# Patient Record
Sex: FEMALE
[De-identification: names, ages, dates, MRNs, and addresses within clinical notes are randomized per-mention and may not be internally consistent; named-entity substitution may affect disease eponyms.]

---

## 2023-02-06 PROBLEM — Z00.00 ENCOUNTER FOR PREVENTIVE HEALTH EXAMINATION: Status: ACTIVE | Noted: 2023-02-06

## 2023-03-20 ENCOUNTER — APPOINTMENT (OUTPATIENT)
Dept: ENDOCRINOLOGY | Facility: CLINIC | Age: 33
End: 2023-03-20
Payer: SELF-PAY

## 2023-03-20 VITALS
SYSTOLIC BLOOD PRESSURE: 108 MMHG | WEIGHT: 129 LBS | TEMPERATURE: 98.3 F | BODY MASS INDEX: 22.86 KG/M2 | HEIGHT: 63 IN | DIASTOLIC BLOOD PRESSURE: 62 MMHG | OXYGEN SATURATION: 99 % | HEART RATE: 48 BPM

## 2023-03-20 DIAGNOSIS — E04.2 NONTOXIC MULTINODULAR GOITER: ICD-10-CM

## 2023-03-20 DIAGNOSIS — E89.0 POSTPROCEDURAL HYPOTHYROIDISM: ICD-10-CM

## 2023-03-20 DIAGNOSIS — E03.9 HYPOTHYROIDISM, UNSPECIFIED: ICD-10-CM

## 2023-03-20 DIAGNOSIS — E78.5 HYPERLIPIDEMIA, UNSPECIFIED: ICD-10-CM

## 2023-03-20 PROCEDURE — 99213 OFFICE O/P EST LOW 20 MIN: CPT

## 2023-03-20 NOTE — ASSESSMENT
[FreeTextEntry1] : Young  female who has a history of hypothyroidism secondary to a [place] the year [place].  Patient claimed that it was a benign lesion.  She is currently on levothyroxine 150 mcg tablets every day.  Clinically she is euthyroid.  Recommendation\par 1.  I have advised the patient to obtain a blood test to check her TSH level.  If this is within acceptable range then the patient will continue with the levothyroxine 150 mcg tablets every day.\par 2.  If the patient is stable then she will follow-up in approximately 6 months time.  The plan was discussed with the patient thank you

## 2023-03-20 NOTE — HISTORY OF PRESENT ILLNESS
[FreeTextEntry1] : 33-year-old  female who presents for evaluation of hypothyroidism.  Patient is accompanied by her  who is acting as an .  Patient is currently on levothyroxine 150 mcg tablets every day.  She claimed that she is compliant with her medications and takes it every day in the morning on empty stomach.  She denies any symptoms of palpitations chest pain shortness of breath hair loss or skin changes.  Her weight has been stable.  Physically she is active.

## 2023-08-02 RX ORDER — LEVOTHYROXINE SODIUM 0.15 MG/1
150 TABLET ORAL
Qty: 90 | Refills: 3 | Status: ACTIVE | COMMUNITY
Start: 2023-05-01 | End: 1900-01-01

## 2023-09-11 ENCOUNTER — APPOINTMENT (OUTPATIENT)
Dept: ENDOCRINOLOGY | Facility: CLINIC | Age: 33
End: 2023-09-11

## 2024-10-17 ENCOUNTER — RX RENEWAL (OUTPATIENT)
Age: 34
End: 2024-10-17

## 2025-01-23 ENCOUNTER — RX RENEWAL (OUTPATIENT)
Age: 35
End: 2025-01-23

## 2025-01-24 ENCOUNTER — APPOINTMENT (OUTPATIENT)
Dept: ENDOCRINOLOGY | Facility: CLINIC | Age: 35
End: 2025-01-24